# Patient Record
Sex: MALE | Race: ASIAN | NOT HISPANIC OR LATINO | URBAN - METROPOLITAN AREA
[De-identification: names, ages, dates, MRNs, and addresses within clinical notes are randomized per-mention and may not be internally consistent; named-entity substitution may affect disease eponyms.]

---

## 2020-07-30 ENCOUNTER — OUTPATIENT (OUTPATIENT)
Dept: OUTPATIENT SERVICES | Facility: HOSPITAL | Age: 50
LOS: 1 days | End: 2020-07-30
Payer: COMMERCIAL

## 2020-07-30 ENCOUNTER — APPOINTMENT (OUTPATIENT)
Dept: UROLOGY | Facility: CLINIC | Age: 50
End: 2020-07-30
Payer: COMMERCIAL

## 2020-07-30 VITALS
WEIGHT: 182 LBS | HEIGHT: 71 IN | BODY MASS INDEX: 25.48 KG/M2 | HEART RATE: 83 BPM | SYSTOLIC BLOOD PRESSURE: 136 MMHG | DIASTOLIC BLOOD PRESSURE: 80 MMHG

## 2020-07-30 VITALS — TEMPERATURE: 98.4 F

## 2020-07-30 DIAGNOSIS — Z78.9 OTHER SPECIFIED HEALTH STATUS: ICD-10-CM

## 2020-07-30 PROCEDURE — 52000 CYSTOURETHROSCOPY: CPT

## 2020-07-30 PROCEDURE — 76775 US EXAM ABDO BACK WALL LIM: CPT | Mod: 26

## 2020-07-30 PROCEDURE — 76775 US EXAM ABDO BACK WALL LIM: CPT

## 2020-07-30 PROCEDURE — 99204 OFFICE O/P NEW MOD 45 MIN: CPT | Mod: 25

## 2020-07-30 RX ORDER — ENALAPRIL MALEATE AND HYDROCHLOROTHIAZIDE 10; 25 MG/1; MG/1
10-25 TABLET ORAL
Refills: 0 | Status: ACTIVE | COMMUNITY

## 2020-07-30 RX ORDER — BISOPROLOL FUMARATE 5 MG/1
5 TABLET, FILM COATED ORAL
Refills: 0 | Status: ACTIVE | COMMUNITY

## 2020-07-30 RX ORDER — AMLODIPINE BESYLATE 5 MG/1
TABLET ORAL
Refills: 0 | Status: ACTIVE | COMMUNITY

## 2020-07-31 LAB
PSA FREE FLD-MCNC: 21 %
PSA FREE SERPL-MCNC: 0.41 NG/ML
PSA SERPL-MCNC: 1.93 NG/ML

## 2020-08-01 ENCOUNTER — OUTPATIENT (OUTPATIENT)
Dept: OUTPATIENT SERVICES | Facility: HOSPITAL | Age: 50
LOS: 1 days | End: 2020-08-01
Payer: COMMERCIAL

## 2020-08-01 ENCOUNTER — APPOINTMENT (OUTPATIENT)
Dept: CT IMAGING | Facility: IMAGING CENTER | Age: 50
End: 2020-08-01

## 2020-08-01 ENCOUNTER — RESULT REVIEW (OUTPATIENT)
Age: 50
End: 2020-08-01

## 2020-08-01 DIAGNOSIS — R31.0 GROSS HEMATURIA: ICD-10-CM

## 2020-08-01 PROCEDURE — 74178 CT ABD&PLV WO CNTR FLWD CNTR: CPT

## 2020-08-01 PROCEDURE — 82565 ASSAY OF CREATININE: CPT

## 2020-08-01 PROCEDURE — 74178 CT ABD&PLV WO CNTR FLWD CNTR: CPT | Mod: 26

## 2020-08-02 NOTE — LETTER BODY
[FreeTextEntry1] : Minal Arias MD\par [address]\[place] [address]\par [phone]\par \par Dear Dr. Arias,\par \par Reason for visit: Gross hematuria. Right flank pain.\par \par This is a 50 year-old man with gross hematuria and right flank pain. He denies any dysuria or urinary incontinence. The patient denies any aggravating or relieving factors. The patient denies any interference of function. The patient is entirely asymptomatic. All other review of systems are negative. He has no cancer in his family medical history. He has no previous surgical history. Past medical history, family history and social history were inquired and were noncontributory to current condition. The patient does not use tobacco or drink alcohol. Medications and allergies were reviewed. He has no known allergies to medication. \par \par On examination, the patient is a healthy-appearing gentleman in no acute distress. He is alert and oriented follows commands. He has normal mood and affect. He is normocephalic. Neck is supple. Respirations are unlabored. His abdomen is soft and nontender. Bladder is nonpalpable. No CVA tenderness. Neurologically he is grossly intact. No peripheral edema. Skin without gross abnormality. He has normal male external genitalia. Normal meatus. Bilateral testes are descended intrascrotally and normal to palpation. On rectal examination, there is normal sphincter tone. The prostate is clinically benign without focal induration or nodularity.\par \par Assessment: Gross hematuria.\par \par I counseled the patient on the various etiologies of the gross hematuria. I discussed the risk of occult malignancy. I counseled the patient about gross hematuria. I recommended patient obtain urinalysis, urine cytology, cystoscopy, and renal ultrasound to further evaluate. I counseled the patient about the procedures. I answered the patient's questions. The risks and expected outcomes were discussed. The patient will follow up as directed and contact me with any questions or concerns. Thank you for the opportunity to participate in the care of Mr. REID. I will keep you updated on his progress.\par \par Plan: Urinalysis. Cytology. Cystoscopy. Renal ultrasound. \par \par I personally reviewed ultrasound images with the patient today. Images demonstrated that both kidneys are normal in size and echogenicity without stones, hydronephrosis or solid masses visualized. \par \par His cystoscopy today demonstrated a buldge on the right ureteral orifice. I recommend he obtain a CT urogram for further evaluation.  I counseled the patient regarding the procedure. The risks and benefits were discussed. Alternatives were given. I answered the patient questions. The patient will take the necessary preparations for the procedure. \par \par Plan: CT urogram. Follow up as directed.

## 2020-08-02 NOTE — ADDENDUM
[FreeTextEntry1] : Entered by Danny Rutledge, acting as scribe for Dr. Silas London.\par \par The documentation recorded by the scribe accurately reflects the service I personally performed and the decisions made by me.

## 2020-08-03 LAB — URINE CYTOLOGY: NORMAL

## 2020-08-03 RX ORDER — TAMSULOSIN HYDROCHLORIDE 0.4 MG/1
0.4 CAPSULE ORAL
Qty: 30 | Refills: 3 | Status: ACTIVE | COMMUNITY
Start: 2020-08-03 | End: 1900-01-01

## 2020-08-04 DIAGNOSIS — R31.0 GROSS HEMATURIA: ICD-10-CM

## 2020-08-04 DIAGNOSIS — Z78.9 OTHER SPECIFIED HEALTH STATUS: ICD-10-CM

## 2020-08-04 DIAGNOSIS — R10.9 UNSPECIFIED ABDOMINAL PAIN: ICD-10-CM

## 2020-08-20 ENCOUNTER — OUTPATIENT (OUTPATIENT)
Dept: OUTPATIENT SERVICES | Facility: HOSPITAL | Age: 50
LOS: 1 days | End: 2020-08-20
Payer: COMMERCIAL

## 2020-08-20 ENCOUNTER — APPOINTMENT (OUTPATIENT)
Dept: UROLOGY | Facility: CLINIC | Age: 50
End: 2020-08-20
Payer: COMMERCIAL

## 2020-08-20 VITALS — TEMPERATURE: 98.1 F

## 2020-08-20 DIAGNOSIS — R35.0 FREQUENCY OF MICTURITION: ICD-10-CM

## 2020-08-20 PROCEDURE — 99213 OFFICE O/P EST LOW 20 MIN: CPT | Mod: 25

## 2020-08-20 PROCEDURE — 76775 US EXAM ABDO BACK WALL LIM: CPT | Mod: 26

## 2020-08-20 PROCEDURE — 76775 US EXAM ABDO BACK WALL LIM: CPT

## 2020-08-20 NOTE — LETTER BODY
[FreeTextEntry1] : Minal Arias MD\par [address]\[place] [address]\par [phone]\par \par Dear Dr. Arias,\par \par Reason for visit: Gross hematuria. Right flank pain.\par \par This is a 50 year-old man with previous gross hematuria and right flank pain. Patient underwent cystoscopy last visit demonstrating a buldge in the right ureteral orifice. He obtained a CT urogram two weeks ago which demonstrated right hydronephrosis secondary to a 4 mm distal ureteral stone. He requested medical expulsion therapy with Flomax and returns today for renal ultrasound. Since he was last seen, patient continues to take Flomax but denies passing any stones. He denies any interval changes in health.  All other review of systems are negative. Past medical history, family history and social history were unchanged. Medications and allergies were reviewed. He has no known allergies to medication. \par \par On examination, the patient is a healthy-appearing gentleman in no acute distress. He is alert and oriented follows commands. He has normal mood and affect. He is normocephalic. Neck is supple. Respirations are unlabored. His abdomen is soft and nontender. Bladder is nonpalpable. No CVA tenderness. Neurologically he is grossly intact. No peripheral edema. Skin without gross abnormality. He has normal male external genitalia. Normal meatus. Bilateral testes are descended intrascrotally and normal to palpation. On rectal examination, there is normal sphincter tone. The prostate is clinically benign without focal induration or nodularity.\par \par His PSA is 1.93, within normal limits. His urine cytology was negative.\par \par I personally reviewed ultrasound images with the patient today. Images demonstrated multiple stones in the urinary bladder, the largest measures 3.1 mm. Both kidneys are normal in size and echogenicity without stones, hydronephrosis or solid masses visualized. \par \par \par Assessment: Right ureteral stone.\par \par I counseled the patient. Ultrasound today demonstrated persistent right ureteral stone in the same location as prior CT scan. I recommend he continue taking Flomax for medical expulsion therapy. I also encouraged frequent hydration to facilitate stone passage. I answered the patient's questions. The risks and expected outcomes were discussed. The patient will follow up as directed and contact me with any questions or concerns. Thank you for the opportunity to participate in the care of Mr. REID. I will keep you updated on his progress.\par \par Plan: Continue Flomax. Follow up as directed.

## 2020-08-26 DIAGNOSIS — N20.1 CALCULUS OF URETER: ICD-10-CM

## 2020-12-14 ENCOUNTER — APPOINTMENT (OUTPATIENT)
Dept: UROLOGY | Facility: CLINIC | Age: 50
End: 2020-12-14
Payer: COMMERCIAL

## 2020-12-14 ENCOUNTER — OUTPATIENT (OUTPATIENT)
Dept: OUTPATIENT SERVICES | Facility: HOSPITAL | Age: 50
LOS: 1 days | End: 2020-12-14
Payer: COMMERCIAL

## 2020-12-14 DIAGNOSIS — R35.0 FREQUENCY OF MICTURITION: ICD-10-CM

## 2020-12-14 PROCEDURE — 99072 ADDL SUPL MATRL&STAF TM PHE: CPT

## 2020-12-14 PROCEDURE — 99214 OFFICE O/P EST MOD 30 MIN: CPT | Mod: 25

## 2020-12-14 PROCEDURE — 76775 US EXAM ABDO BACK WALL LIM: CPT

## 2020-12-14 PROCEDURE — 76775 US EXAM ABDO BACK WALL LIM: CPT | Mod: 26

## 2020-12-14 NOTE — ADDENDUM
[FreeTextEntry1] : Entered by Amandeep Kate, acting as scribe for Dr. Silas London.\par \par The documentation recorded by the scribe accurately reflects the service I personally performed and the decisions made by me.\par

## 2020-12-14 NOTE — LETTER BODY
[FreeTextEntry1] : Minal Arias MD\par [address]\[place] [address]\par [phone]\par \par Dear Dr. Arias,\par \par Reason for visit: Previous gross hematuria. Intermittent RLQ pain.\par \par This is a 50 year-old man with previous gross hematuria and intermittent RLQ pain. Patient underwent cystoscopy in July demonstrating a bulge in the right ureteral orifice. He obtained a CT urogram in August which demonstrated right hydronephrosis secondary to a 4 mm distal ureteral stone. HIs renal ultrasound in Aug demonstrated multiple stones in the urinary bladder, the largest measuring 3.1 mm. He returns today for follow-up renal ultrasound. Since he was last seen, the patient reports passing a stone fragment, which he saved and brought with him today. The patient also complains of intermittent RLQ pain. He denies any changes in health. All other review of systems are negative. Past medical history, family history and social history were unchanged. Medications and allergies were reviewed. He has no known allergies to medication. \par \par On examination, the patient is a healthy-appearing gentleman in no acute distress. He is alert and oriented follows commands. He has normal mood and affect. He is normocephalic. Neck is supple. Respirations are unlabored. His abdomen is soft and nontender. Bladder is nonpalpable. No CVA tenderness. Neurologically he is grossly intact. No peripheral edema. Skin without gross abnormality. He has normal male external genitalia. Normal meatus. Bilateral testes are descended intrascrotally and normal to palpation. On rectal examination, there is normal sphincter tone. The prostate is clinically benign without focal induration or nodularity.\par \par I personally reviewed ultrasound images with the patient today and images demonstrated unremarkable exam. No renal or bladder stones bilaterally. Both kidneys are normal in size and echogenicity without stones, hydronephrosis or solid masses visualized\par \par Assessment: Right ureteral stone. Intermittent RLQ pain.\par \par I counseled the patient. His renal ultrasound today was unremarkable. I recommended he obtain a noncontrast CT abdomen and pelvis for further evaluation. Given that he passed and brought a stone fragment with him today, I recommended he obtain calculus analysis today to evaluate the composition of his stone. The patient will follow-up in 1 year to ensure stability. Risks and alternatives were discussed. I answered the patient questions. The patient will follow-up as directed and will contact me with any questions or concerns. Thank you for the opportunity to participate in the care of Mr. REID. I will keep you updated on his progress.\par \par Plan: Calculus analysis. CT abdomen and pelvis. Follow-up in 1 year.

## 2020-12-18 DIAGNOSIS — N20.1 CALCULUS OF URETER: ICD-10-CM

## 2020-12-18 DIAGNOSIS — R31.0 GROSS HEMATURIA: ICD-10-CM

## 2020-12-18 DIAGNOSIS — R10.9 UNSPECIFIED ABDOMINAL PAIN: ICD-10-CM

## 2020-12-18 LAB — NIDUS STONE QN: NORMAL

## 2020-12-28 ENCOUNTER — APPOINTMENT (OUTPATIENT)
Dept: CT IMAGING | Facility: IMAGING CENTER | Age: 50
End: 2020-12-28
Payer: COMMERCIAL

## 2020-12-28 ENCOUNTER — OUTPATIENT (OUTPATIENT)
Dept: OUTPATIENT SERVICES | Facility: HOSPITAL | Age: 50
LOS: 1 days | End: 2020-12-28
Payer: COMMERCIAL

## 2020-12-28 DIAGNOSIS — Z00.8 ENCOUNTER FOR OTHER GENERAL EXAMINATION: ICD-10-CM

## 2020-12-28 DIAGNOSIS — R31.0 GROSS HEMATURIA: ICD-10-CM

## 2020-12-28 DIAGNOSIS — Z00.00 ENCOUNTER FOR GENERAL ADULT MEDICAL EXAMINATION WITHOUT ABNORMAL FINDINGS: ICD-10-CM

## 2020-12-28 DIAGNOSIS — R10.9 UNSPECIFIED ABDOMINAL PAIN: ICD-10-CM

## 2020-12-28 DIAGNOSIS — N20.1 CALCULUS OF URETER: ICD-10-CM

## 2020-12-28 PROCEDURE — 74176 CT ABD & PELVIS W/O CONTRAST: CPT

## 2020-12-28 PROCEDURE — 74176 CT ABD & PELVIS W/O CONTRAST: CPT | Mod: 26

## 2021-01-04 ENCOUNTER — NON-APPOINTMENT (OUTPATIENT)
Age: 51
End: 2021-01-04

## 2021-01-05 ENCOUNTER — APPOINTMENT (OUTPATIENT)
Dept: UROLOGY | Facility: CLINIC | Age: 51
End: 2021-01-05

## 2021-07-06 ENCOUNTER — APPOINTMENT (OUTPATIENT)
Dept: ULTRASOUND IMAGING | Facility: IMAGING CENTER | Age: 51
End: 2021-07-06

## 2021-07-12 ENCOUNTER — APPOINTMENT (OUTPATIENT)
Dept: UROLOGY | Facility: CLINIC | Age: 51
End: 2021-07-12

## 2021-07-26 ENCOUNTER — OUTPATIENT (OUTPATIENT)
Dept: OUTPATIENT SERVICES | Facility: HOSPITAL | Age: 51
LOS: 1 days | End: 2021-07-26
Payer: COMMERCIAL

## 2021-07-26 ENCOUNTER — APPOINTMENT (OUTPATIENT)
Dept: UROLOGY | Facility: CLINIC | Age: 51
End: 2021-07-26
Payer: COMMERCIAL

## 2021-07-26 DIAGNOSIS — R35.0 FREQUENCY OF MICTURITION: ICD-10-CM

## 2021-07-26 DIAGNOSIS — N20.1 CALCULUS OF URETER: ICD-10-CM

## 2021-07-26 PROCEDURE — 76775 US EXAM ABDO BACK WALL LIM: CPT | Mod: 26

## 2021-07-26 PROCEDURE — 76775 US EXAM ABDO BACK WALL LIM: CPT

## 2021-07-26 PROCEDURE — 99213 OFFICE O/P EST LOW 20 MIN: CPT

## 2021-07-26 NOTE — LETTER BODY
[FreeTextEntry1] : Minal Arias MD\par [address]\[place] [address]\par [phone]\par \par Dear Dr. Arias,\par \par Reason for visit: Previous gross hematuria. Intermittent RLQ pain.\par \par This is a 51 year-old man with previous gross hematuria and intermittent RLQ pain. Patient underwent cystoscopy in July 2020 demonstrating a bulge in the right ureteral orifice. He obtained a CT urogram in August 2020 which demonstrated right hydronephrosis secondary to a 4 mm distal ureteral stone. HIs renal ultrasound in Aug demonstrated multiple stones in the urinary bladder, the largest measuring 3.1 mm. The patient previously passed a stone fragment. Calculus analysis demonstrated a calcium oxalate stone. He returns today for follow-up renal ultrasound. Since he was last seen, he denies any changes in health. All other review of systems are negative. Past medical history, family history and social history were unchanged. Medications and allergies were reviewed. He has no known allergies to medication. \par \par On examination, the patient is a healthy-appearing gentleman in no acute distress. He is alert and oriented follows commands. He has normal mood and affect. He is normocephalic. Neck is supple. Respirations are unlabored. His abdomen is soft and nontender. Bladder is nonpalpable. No CVA tenderness. Neurologically he is grossly intact. No peripheral edema. Skin without gross abnormality. He has normal male external genitalia. Normal meatus. Bilateral testes are descended intrascrotally and normal to palpation. On rectal examination, there is normal sphincter tone. The prostate is clinically benign without focal induration or nodularity.\par \par I personally reviewed ultrasound images with the patient today and images demonstrated unremarkable exam. Both kidneys are normal in size and echogenicity without hydronephrosis, stones or solid masses visualized. \par \par Assessment: Right ureteral stone. Intermittent RLQ pain.\par \par I counseled the patient. His renal ultrasound today was unremarkable. I encouraged patient to maintain a low-protein low-sodium diet. I also encouraged hydration to prevent stone formation. The patient will follow-up in 1 year to ensure stability. Risks and alternatives were discussed. I answered the patient questions. Patient understands that if he develops gross hematuria or any urinary discomfort, he will contact me for further evaluation. The patient will follow-up as directed and will contact me with any questions or concerns. Thank you for the opportunity to participate in the care of Mr. REID. I will keep you updated on his progress.\par \par Plan:  Follow-up in 1 year for repeat renal ultrasound.

## 2021-07-26 NOTE — HISTORY OF PRESENT ILLNESS
[FreeTextEntry1] : Please refer to URO Consult note \par \par FUA KIDNEY STONE\par STONE ANALYSIS Stone analysis demonstrated calcium oxalate stone. \par \par RENAL US UNREMARKABLE\par \par Followup in 1 year RENAL US

## 2022-03-12 ENCOUNTER — TRANSCRIPTION ENCOUNTER (OUTPATIENT)
Age: 52
End: 2022-03-12

## 2022-07-25 ENCOUNTER — APPOINTMENT (OUTPATIENT)
Dept: UROLOGY | Facility: CLINIC | Age: 52
End: 2022-07-25

## 2022-07-25 ENCOUNTER — OUTPATIENT (OUTPATIENT)
Dept: OUTPATIENT SERVICES | Facility: HOSPITAL | Age: 52
LOS: 1 days | End: 2022-07-25
Payer: COMMERCIAL

## 2022-07-25 DIAGNOSIS — R35.0 FREQUENCY OF MICTURITION: ICD-10-CM

## 2022-07-25 PROCEDURE — 76775 US EXAM ABDO BACK WALL LIM: CPT | Mod: 26

## 2022-07-25 PROCEDURE — 99213 OFFICE O/P EST LOW 20 MIN: CPT | Mod: 25

## 2022-07-25 PROCEDURE — 76775 US EXAM ABDO BACK WALL LIM: CPT

## 2022-07-27 LAB
ANION GAP SERPL CALC-SCNC: 14 MMOL/L
APPEARANCE: CLEAR
BACTERIA: NEGATIVE
BILIRUBIN URINE: NEGATIVE
BLOOD URINE: NEGATIVE
BUN SERPL-MCNC: 14 MG/DL
CALCIUM SERPL-MCNC: 10 MG/DL
CHLORIDE SERPL-SCNC: 102 MMOL/L
CO2 SERPL-SCNC: 27 MMOL/L
COLOR: COLORLESS
CREAT SERPL-MCNC: 1.01 MG/DL
EGFR: 91 ML/MIN/1.73M2
GLUCOSE QUALITATIVE U: NEGATIVE
GLUCOSE SERPL-MCNC: 90 MG/DL
HYALINE CASTS: 0 /LPF
KETONES URINE: NEGATIVE
LEUKOCYTE ESTERASE URINE: NEGATIVE
MICROSCOPIC-UA: NORMAL
NITRITE URINE: NEGATIVE
PH URINE: 6
POTASSIUM SERPL-SCNC: 4.6 MMOL/L
PROTEIN URINE: NEGATIVE
PSA FREE FLD-MCNC: 14 %
PSA FREE SERPL-MCNC: 0.61 NG/ML
PSA SERPL-MCNC: 4.38 NG/ML
RED BLOOD CELLS URINE: 0 /HPF
SODIUM SERPL-SCNC: 143 MMOL/L
SPECIFIC GRAVITY URINE: 1.01
SQUAMOUS EPITHELIAL CELLS: 0 /HPF
UROBILINOGEN URINE: NORMAL
WHITE BLOOD CELLS URINE: 0 /HPF

## 2022-07-28 LAB — URINE CYTOLOGY: NORMAL

## 2022-08-02 NOTE — LETTER BODY
[FreeTextEntry1] : Minal Arias MD\par [address]\[place] [address]\par [phone]\par \par Dear Dr. Arias,\par \par Reason for visit: Previous gross hematuria. Intermittent RLQ pain. Right calcium oxalate stone.\par \par This is a 52 year-old man with previous gross hematuria and intermittent RLQ pain. Patient underwent cystoscopy in July 2020 demonstrating a bulge in the right ureteral orifice. He obtained a CT urogram in August 2020 which demonstrated right hydronephrosis secondary to a 4 mm distal ureteral stone. HIs renal ultrasound in Aug demonstrated multiple stones in the urinary bladder, the largest measuring 3.1 mm. The patient previously passed a stone fragment. Calculus analysis demonstrated a calcium oxalate stone. He returns today for follow-up renal ultrasound. Since he was last seen, he denies any changes in health. All other review of systems are negative. Past medical history, family history and social history were unchanged. Medications and allergies were reviewed. He has no known allergies to medication. \par \par On examination, the patient is a healthy-appearing gentleman in no acute distress. He is alert and oriented follows commands. He has normal mood and affect. He is normocephalic. Neck is supple. Respirations are unlabored. His abdomen is soft and nontender. Bladder is nonpalpable. No CVA tenderness. Neurologically he is grossly intact. No peripheral edema. Skin without gross abnormality. He has normal male external genitalia. Normal meatus. Bilateral testes are descended intrascrotally and normal to palpation. On rectal examination, there is normal sphincter tone. The prostate is clinically benign without focal induration or nodularity.\par \par I personally reviewed ultrasound images with the patient today and images demonstrated unremarkable exam. Both kidneys are normal in size and echogenicity without hydronephrosis, stones or solid masses visualized. \par \par Assessment: Right ureteral stone. Intermittent RLQ pain.\par \par I counseled the patient. His renal ultrasound today was unremarkable. I encouraged patient to maintain a low-protein low-sodium diet. I also encouraged hydration to prevent stone formation. I recommended the patient obtain PSA, BMP, urinalysis, and urine cytology to monitor his urinary function. Risks and alternatives were discussed. I answered the patient questions. Patient understands that if he develops gross hematuria or any urinary discomfort, he will contact me for further evaluation. The patient will follow-up as directed and will contact me with any questions or concerns. Thank you for the opportunity to participate in the care of Mr. REID. I will keep you updated on his progress.\par \par Plan: Maintain low protein low sodium diet. Encourage hydration. PSA. BMP. Urinalysis. Urine culture. Follow-up as needed\par

## 2022-08-02 NOTE — ADDENDUM
[FreeTextEntry1] : Entered by ARA BRICE , acting as scribe for Dr. Silas London.\par The documentation recorded by the scribe accurately reflects the service I personally performed and the decisions made by me.

## 2022-08-02 NOTE — HISTORY OF PRESENT ILLNESS
[FreeTextEntry1] : Follow-up kidney stone.  Patient has calcium oxalate stone.  Patient denies any interval complaints.\par \par Ultrasound today demonstrated no hydronephrosis.\par \par I encouraged patient to maintain a low-protein low-sodium diet. I also encouraged hydration to prevent stone formation. \par \par PSA.  Follow-up as needed.\par \par Please refer to URO Consult note\par

## 2022-08-06 DIAGNOSIS — R31.0 GROSS HEMATURIA: ICD-10-CM

## 2022-08-06 DIAGNOSIS — Z00.00 ENCOUNTER FOR GENERAL ADULT MEDICAL EXAMINATION WITHOUT ABNORMAL FINDINGS: ICD-10-CM

## 2022-08-06 DIAGNOSIS — N20.1 CALCULUS OF URETER: ICD-10-CM

## 2022-08-06 DIAGNOSIS — R10.9 UNSPECIFIED ABDOMINAL PAIN: ICD-10-CM

## 2022-08-28 LAB
PSA FREE FLD-MCNC: 16 %
PSA FREE SERPL-MCNC: 0.58 NG/ML
PSA SERPL-MCNC: 3.69 NG/ML

## 2022-08-29 ENCOUNTER — NON-APPOINTMENT (OUTPATIENT)
Age: 52
End: 2022-08-29

## 2023-01-30 ENCOUNTER — APPOINTMENT (OUTPATIENT)
Dept: UROLOGY | Facility: CLINIC | Age: 53
End: 2023-01-30
Payer: COMMERCIAL

## 2023-01-30 ENCOUNTER — APPOINTMENT (OUTPATIENT)
Dept: ULTRASOUND IMAGING | Facility: IMAGING CENTER | Age: 53
End: 2023-01-30

## 2023-01-30 ENCOUNTER — OUTPATIENT (OUTPATIENT)
Dept: OUTPATIENT SERVICES | Facility: HOSPITAL | Age: 53
LOS: 1 days | End: 2023-01-30
Payer: COMMERCIAL

## 2023-01-30 DIAGNOSIS — R31.0 GROSS HEMATURIA: ICD-10-CM

## 2023-01-30 DIAGNOSIS — R10.9 UNSPECIFIED ABDOMINAL PAIN: ICD-10-CM

## 2023-01-30 DIAGNOSIS — N20.1 CALCULUS OF URETER: ICD-10-CM

## 2023-01-30 DIAGNOSIS — Z00.00 ENCOUNTER FOR GENERAL ADULT MEDICAL EXAMINATION W/OUT ABNORMAL FINDINGS: ICD-10-CM

## 2023-01-30 PROCEDURE — 76775 US EXAM ABDO BACK WALL LIM: CPT | Mod: 26

## 2023-01-30 PROCEDURE — 99213 OFFICE O/P EST LOW 20 MIN: CPT

## 2023-01-30 PROCEDURE — 76775 US EXAM ABDO BACK WALL LIM: CPT

## 2023-01-30 PROCEDURE — 88112 CYTOPATH CELL ENHANCE TECH: CPT | Mod: 26

## 2023-01-30 NOTE — LETTER BODY
[FreeTextEntry1] : Minal Arias MD\par [address]\[place] [address]\par [phone]\par \par Dear Dr. Arias,\par \par Reason for visit: Previous gross hematuria. Intermittent RLQ pain. Right calcium oxalate stone. Elevated PSA. \par \par This is a 52 year-old man with previous gross hematuria and intermittent RLQ pain. Patient underwent cystoscopy in July 2020 demonstrating a bulge in the right ureteral orifice. He obtained a CT urogram in August 2020 which demonstrated right hydronephrosis secondary to a 4 mm distal ureteral stone. His renal ultrasound in Aug demonstrated multiple stones in the urinary bladder, the largest measuring 3.1 mm. The patient previously passed a stone fragment. Calculus analysis demonstrated a calcium oxalate stone. His last ultrasound was unremarkable. He returns today for follow-up. Since he was last seen, he denies any changes in health. All other review of systems are negative. Past medical history, family history and social history were unchanged. Medications and allergies were reviewed. He has no known allergies to medication. \par \par On examination, the patient is a healthy-appearing gentleman in no acute distress. He is alert and oriented follows commands. He has normal mood and affect. He is normocephalic. Neck is supple. Respirations are unlabored. His abdomen is soft and nontender. Bladder is nonpalpable. No CVA tenderness. Neurologically he is grossly intact. No peripheral edema. Skin without gross abnormality. He has normal male external genitalia. Normal meatus. Bilateral testes are descended intrascrotally and normal to palpation. On rectal examination, there is normal sphincter tone. The prostate is clinically benign without focal induration or nodularity.\par \par Assessment: Right ureteral stone. Intermittent RLQ pain. Elevated PSA. Hematuria. \par \par I counseled the patient. In terms of his previous kidney stones, I recommended that he repeat ultrasound. In terms of his history of elevated PSA, I recommended that he repeat PSA today. In terms of his hematuria, I recommended that he repeat urinalysis today to evaluate for infection. I also encouraged the patient to obtain BMP and urine cytology today. Risks and alternatives were discussed. I answered the patient questions. Patient understands that if he develops gross hematuria or any urinary discomfort, he will contact me for further evaluation. The patient will follow-up in 1 year and will contact me with any questions or concerns. Thank you for the opportunity to participate in the care of Mr. REID. I will keep you updated on his progress.\par \par Plan: Ultrasound. PSA. BMP. Urinalysis. Urine cytology. Follow-up in 1 year.

## 2023-01-30 NOTE — ADDENDUM
[FreeTextEntry1] : Entered by Michela Roberts, acting as scribe for Dr. Silas London.\par The documentation recorded by the scribe accurately reflects the service I personally performed and the decisions made by me.

## 2023-02-02 LAB
ANION GAP SERPL CALC-SCNC: 14 MMOL/L
APPEARANCE: CLEAR
BACTERIA: NEGATIVE
BILIRUBIN URINE: NEGATIVE
BLOOD URINE: NEGATIVE
BUN SERPL-MCNC: 14 MG/DL
CALCIUM SERPL-MCNC: 9.9 MG/DL
CHLORIDE SERPL-SCNC: 106 MMOL/L
CO2 SERPL-SCNC: 26 MMOL/L
COLOR: YELLOW
CREAT SERPL-MCNC: 1.1 MG/DL
EGFR: 82 ML/MIN/1.73M2
GLUCOSE QUALITATIVE U: NEGATIVE
GLUCOSE SERPL-MCNC: 91 MG/DL
HYALINE CASTS: 0 /LPF
KETONES URINE: NEGATIVE
LEUKOCYTE ESTERASE URINE: NEGATIVE
MICROSCOPIC-UA: NORMAL
NITRITE URINE: NEGATIVE
PH URINE: 6
POTASSIUM SERPL-SCNC: 4.7 MMOL/L
PROTEIN URINE: NORMAL
PSA FREE FLD-MCNC: 16 %
PSA FREE SERPL-MCNC: 0.57 NG/ML
PSA SERPL-MCNC: 3.5 NG/ML
RED BLOOD CELLS URINE: 1 /HPF
SODIUM SERPL-SCNC: 146 MMOL/L
SPECIFIC GRAVITY URINE: 1.03
SQUAMOUS EPITHELIAL CELLS: 0 /HPF
URINE CYTOLOGY: NORMAL
UROBILINOGEN URINE: NORMAL
WHITE BLOOD CELLS URINE: 1 /HPF

## 2024-02-12 ENCOUNTER — APPOINTMENT (OUTPATIENT)
Dept: UROLOGY | Facility: CLINIC | Age: 54
End: 2024-02-12
Payer: COMMERCIAL

## 2024-02-12 PROCEDURE — 99214 OFFICE O/P EST MOD 30 MIN: CPT

## 2024-02-12 NOTE — HISTORY OF PRESENT ILLNESS
[FreeTextEntry1] : Follow-up kidney stone.  Patient saw stone last was unremarkable.  He denies any flank pain.  Encourage hydration.  I encouraged patient to maintain a low-protein low-sodium diet. I also encouraged hydration to prevent stone formation.   Follow-up hematuria.  Amrit urinalysis.  Cytology.  PSA.  Follow-up 1 year.  Patient urinary frequency.  He declines medical therapy.  Consider Flomax.  Please refer to URO Consult note

## 2024-02-12 NOTE — LETTER BODY
[FreeTextEntry1] : Minal Arias MD [address] [id number] [phone]  Dear Dr. Arias,  Reason for visit: Previous gross hematuria. Intermittent RLQ pain. Right calcium oxalate stone. Elevated PSA. Urinary frequency.    This is a 53 year-old man with previous gross hematuria and intermittent RLQ pain. Patient underwent cystoscopy in July 2020 demonstrating a bulge in the right ureteral orifice. He obtained a CT urogram in August 2020 which demonstrated right hydronephrosis secondary to a 4 mm distal ureteral stone. His renal ultrasound in Aug demonstrated multiple stones in the urinary bladder, the largest measuring 3.1 mm. The patient previously passed a stone fragment. Calculus analysis demonstrated a calcium oxalate stone. His last ultrasound was unremarkable. He returns today for follow-up. Since he was last seen, he denies any flank pain. Patient reports of urinary frequency. All other review of systems are negative. Past medical history, family history and social history were unchanged. Medications and allergies were reviewed. He has no known allergies to medication.    On examination, the patient is a healthy-appearing gentleman in no acute distress. He is alert and oriented follows commands. He has normal mood and affect. He is normocephalic. Neck is supple. Respirations are unlabored. His abdomen is soft and nontender. Bladder is nonpalpable. No CVA tenderness. Neurologically he is grossly intact. No peripheral edema. Skin without gross abnormality.     Assessment: Right ureteral stone. Intermittent RLQ pain. Elevated PSA. Hematuria.    I counseled the patient. In terms of his previous kidney stones, his last ultrasound was unremarkable. I encouraged patient to maintain a low-protein low-sodium diet. I also encouraged hydration to prevent stone formation. In terms of his hematuria, he will repeat urinalysis and urine cytology to look for high grade urothelial carcinoma. He will repeat PSA and BMP to establish baseline. In terms of his urinary frequency, I recommended patient consider Flomax. However, patient declines medical therapy. Risks and alternatives were discussed. I answered the patient questions. Patient understands that if he develops gross hematuria or any urinary discomfort, he will contact me for further evaluation. The patient will follow-up in 1 year and will contact me with any questions or concerns. Thank you for the opportunity to participate in the care of Mr. TOGOCHOG. I will keep you updated on his progress.  Plan: Consider Flomax. Stone diet. Hydration. PSA. BMP. Urinalysis. Urine cytology. Follow-up in 1 year.   I spent 30-minutes time today on all issues related to this patient on today date of service including non face to face time.

## 2024-02-13 LAB
ANION GAP SERPL CALC-SCNC: 12 MMOL/L
APPEARANCE: CLEAR
BACTERIA UR CULT: NORMAL
BACTERIA: NEGATIVE /HPF
BILIRUBIN URINE: NEGATIVE
BLOOD URINE: NEGATIVE
BUN SERPL-MCNC: 13 MG/DL
CALCIUM OXALATE CRYSTALS: PRESENT
CALCIUM SERPL-MCNC: 10.2 MG/DL
CAST: 0 /LPF
CHLORIDE SERPL-SCNC: 101 MMOL/L
CO2 SERPL-SCNC: 27 MMOL/L
COLOR: YELLOW
CREAT SERPL-MCNC: 1.13 MG/DL
EGFR: 79 ML/MIN/1.73M2
EPITHELIAL CELLS: 0 /HPF
GLUCOSE QUALITATIVE U: NEGATIVE MG/DL
GLUCOSE SERPL-MCNC: 109 MG/DL
KETONES URINE: NEGATIVE MG/DL
LEUKOCYTE ESTERASE URINE: NEGATIVE
Lab: PRESENT
MICROSCOPIC-UA: NORMAL
NITRITE URINE: NEGATIVE
PH URINE: 5.5
POTASSIUM SERPL-SCNC: 4.3 MMOL/L
PROTEIN URINE: NEGATIVE MG/DL
PSA FREE FLD-MCNC: 16 %
PSA FREE SERPL-MCNC: 0.66 NG/ML
PSA SERPL-MCNC: 4.13 NG/ML
RED BLOOD CELLS URINE: 2 /HPF
REVIEW: NORMAL
SODIUM SERPL-SCNC: 140 MMOL/L
SPECIFIC GRAVITY URINE: 1.02
URINE CYTOLOGY: NORMAL
UROBILINOGEN URINE: 0.2 MG/DL
WHITE BLOOD CELLS URINE: 0 /HPF

## 2024-02-13 RX ORDER — SODIUM PHOSPHATE, DIBASIC AND SODIUM PHOSPHATE, MONOBASIC 7; 19 G/230ML; G/230ML
ENEMA RECTAL
Qty: 1 | Refills: 0 | Status: ACTIVE | COMMUNITY
Start: 2024-02-13 | End: 1900-01-01

## 2024-02-17 ENCOUNTER — APPOINTMENT (OUTPATIENT)
Dept: MRI IMAGING | Facility: CLINIC | Age: 54
End: 2024-02-17
Payer: COMMERCIAL

## 2024-02-17 ENCOUNTER — RESULT REVIEW (OUTPATIENT)
Age: 54
End: 2024-02-17

## 2024-02-17 ENCOUNTER — APPOINTMENT (OUTPATIENT)
Dept: ULTRASOUND IMAGING | Facility: CLINIC | Age: 54
End: 2024-02-17
Payer: COMMERCIAL

## 2024-02-17 PROCEDURE — 72197 MRI PELVIS W/O & W/DYE: CPT

## 2024-02-17 PROCEDURE — 76775 US EXAM ABDO BACK WALL LIM: CPT

## 2024-02-17 PROCEDURE — A9585: CPT

## 2024-02-17 PROCEDURE — 76498P: CUSTOM

## 2024-02-26 ENCOUNTER — NON-APPOINTMENT (OUTPATIENT)
Age: 54
End: 2024-02-26

## 2024-02-27 ENCOUNTER — NON-APPOINTMENT (OUTPATIENT)
Age: 54
End: 2024-02-27

## 2024-02-28 ENCOUNTER — RESULT REVIEW (OUTPATIENT)
Age: 54
End: 2024-02-28

## 2024-02-28 ENCOUNTER — NON-APPOINTMENT (OUTPATIENT)
Age: 54
End: 2024-02-28

## 2024-02-28 RX ORDER — SODIUM PHOSPHATE, DIBASIC AND SODIUM PHOSPHATE, MONOBASIC 7; 19 G/230ML; G/230ML
ENEMA RECTAL
Qty: 1 | Refills: 0 | Status: ACTIVE | COMMUNITY
Start: 2024-02-28 | End: 1900-01-01

## 2024-02-29 PROCEDURE — 76377 3D RENDER W/INTRP POSTPROCES: CPT

## 2024-03-11 ENCOUNTER — APPOINTMENT (OUTPATIENT)
Dept: UROLOGY | Facility: CLINIC | Age: 54
End: 2024-03-11

## 2024-03-11 DIAGNOSIS — R10.9 UNSPECIFIED ABDOMINAL PAIN: ICD-10-CM

## 2024-03-11 DIAGNOSIS — R31.0 GROSS HEMATURIA: ICD-10-CM

## 2024-03-28 ENCOUNTER — APPOINTMENT (OUTPATIENT)
Dept: UROLOGY | Facility: CLINIC | Age: 54
End: 2024-03-28
Payer: COMMERCIAL

## 2024-03-28 ENCOUNTER — OUTPATIENT (OUTPATIENT)
Dept: OUTPATIENT SERVICES | Facility: HOSPITAL | Age: 54
LOS: 1 days | End: 2024-03-28
Payer: COMMERCIAL

## 2024-03-28 VITALS
BODY MASS INDEX: 25.76 KG/M2 | WEIGHT: 184 LBS | HEART RATE: 84 BPM | HEIGHT: 71 IN | DIASTOLIC BLOOD PRESSURE: 90 MMHG | SYSTOLIC BLOOD PRESSURE: 152 MMHG

## 2024-03-28 DIAGNOSIS — Z87.898 PERSONAL HISTORY OF OTHER SPECIFIED CONDITIONS: ICD-10-CM

## 2024-03-28 DIAGNOSIS — R97.20 ELEVATED PROSTATE, SPECIFIC ANTIGEN [PSA]: ICD-10-CM

## 2024-03-28 DIAGNOSIS — R35.0 FREQUENCY OF MICTURITION: ICD-10-CM

## 2024-03-28 PROCEDURE — 55700: CPT

## 2024-03-28 PROCEDURE — 76999F: CUSTOM | Mod: 26

## 2024-03-28 PROCEDURE — 76999 ECHO EXAMINATION PROCEDURE: CPT

## 2024-03-29 ENCOUNTER — NON-APPOINTMENT (OUTPATIENT)
Age: 54
End: 2024-03-29

## 2024-03-29 DIAGNOSIS — R97.20 ELEVATED PROSTATE SPECIFIC ANTIGEN [PSA]: ICD-10-CM

## 2024-03-29 DIAGNOSIS — Z87.898 PERSONAL HISTORY OF OTHER SPECIFIED CONDITIONS: ICD-10-CM

## 2024-04-05 LAB — PROSTATE BIOPSY: NORMAL

## 2024-09-28 DIAGNOSIS — R31.0 GROSS HEMATURIA: ICD-10-CM

## 2024-09-28 DIAGNOSIS — R97.20 ELEVATED PROSTATE, SPECIFIC ANTIGEN [PSA]: ICD-10-CM

## 2024-09-29 ENCOUNTER — NON-APPOINTMENT (OUTPATIENT)
Age: 54
End: 2024-09-29

## 2024-09-30 ENCOUNTER — APPOINTMENT (OUTPATIENT)
Dept: UROLOGY | Facility: CLINIC | Age: 54
End: 2024-09-30
Payer: COMMERCIAL

## 2024-09-30 ENCOUNTER — NON-APPOINTMENT (OUTPATIENT)
Age: 54
End: 2024-09-30

## 2024-09-30 LAB
ANION GAP SERPL CALC-SCNC: 17 MMOL/L
APPEARANCE: CLEAR
BACTERIA: NEGATIVE /HPF
BILIRUBIN URINE: NEGATIVE
BLOOD URINE: NEGATIVE
BUN SERPL-MCNC: 15 MG/DL
CALCIUM SERPL-MCNC: 10.1 MG/DL
CAST: 0 /LPF
CHLORIDE SERPL-SCNC: 102 MMOL/L
CO2 SERPL-SCNC: 25 MMOL/L
COLOR: YELLOW
CREAT SERPL-MCNC: 1.08 MG/DL
EGFR: 83 ML/MIN/1.73M2
EPITHELIAL CELLS: 0 /HPF
GLUCOSE QUALITATIVE U: NEGATIVE MG/DL
GLUCOSE SERPL-MCNC: 83 MG/DL
KETONES URINE: NEGATIVE MG/DL
LEUKOCYTE ESTERASE URINE: NEGATIVE
MICROSCOPIC-UA: NORMAL
NITRITE URINE: NEGATIVE
PH URINE: 5.5
POTASSIUM SERPL-SCNC: 4.3 MMOL/L
PROTEIN URINE: NEGATIVE MG/DL
PSA FREE FLD-MCNC: 15 %
PSA FREE SERPL-MCNC: 0.69 NG/ML
PSA SERPL-MCNC: 4.54 NG/ML
RED BLOOD CELLS URINE: 0 /HPF
SODIUM SERPL-SCNC: 143 MMOL/L
SPECIFIC GRAVITY URINE: 1.02
UROBILINOGEN URINE: 0.2 MG/DL
WHITE BLOOD CELLS URINE: 1 /HPF

## 2024-09-30 PROCEDURE — 99214 OFFICE O/P EST MOD 30 MIN: CPT

## 2024-09-30 PROCEDURE — G2211 COMPLEX E/M VISIT ADD ON: CPT | Mod: NC

## 2024-09-30 NOTE — ADDENDUM
[FreeTextEntry1] : Entered by Jairo Rutledge, acting as scribe for Dr. Silas London. The documentation recorded by the scribe accurately reflects the service I personally performed and the decisions made by me.

## 2024-09-30 NOTE — LETTER BODY
[FreeTextEntry1] : Minal Arias MD [address] [id number] [phone]  Dear Dr. Arias,  Reason for visit: Previous gross hematuria. Intermittent RLQ pain. Right calcium oxalate stone. Elevated PSA. Urinary frequency.    This is a 54 year-old man with previous gross hematuria, elevated PSA, urinary frequency, and intermittent RLQ pain. Patient underwent cystoscopy in July 2020 demonstrating a bulge in the right ureteral orifice. He obtained a CT urogram in August 2020 which demonstrated right hydronephrosis secondary to a 4 mm distal ureteral stone. His renal ultrasound in Aug demonstrated multiple stones in the urinary bladder, the largest measuring 3.1 mm. The patient previously passed a stone fragment. Calculus analysis demonstrated a calcium oxalate stone. His last ultrasound was unremarkable. He returns today for follow-up. Since he was last seen, patient obtained a negative prostate biopsy in March. He denied any recent gross hematuria. All other review of systems are negative. Past medical history, family history and social history were unchanged. Medications and allergies were reviewed. He has no known allergies to medication.    On examination, the patient is a healthy-appearing gentleman in no acute distress. He is alert and oriented follows commands. He has normal mood and affect. He is normocephalic. Neck is supple. Respirations are unlabored. His abdomen is soft and nontender. Bladder is nonpalpable. No CVA tenderness. Neurologically he is grossly intact. No peripheral edema. Skin without gross abnormality.    I personally reviewed prostate biopsy images with the patient today and images were negative for malignancy.     Assessment: Elevated PSA. Hematuria.    I counseled the patient. In terms of his elevated PSA, the patient obtained a recent prostate biopsy which was negative for malignancy. I recommended the patient repeat PSA and BMP to ensure stability. In terms of his previous gross hematuria, the patient denied any recent episodes. He will repeat urinalysis and urine cytology to evaluate for high grade urothelial carcinoma. The patient will follow-up in 1 year and will contact me with any questions or concerns. Thank you for the opportunity to participate in the care of Mr. REID. I will keep you updated on his progress.  Plan: PSA. BMP. Urinalysis. Urine cytology. Follow-up in 1 year.  I spent 30-minutes time today on all issues related to this patient on today date of service including non face to face time.

## 2024-09-30 NOTE — HISTORY OF PRESENT ILLNESS
[FreeTextEntry1] : Follow-up elevated PSA.  Negative prostate biopsy in March.  Reviewed PSA.  Follow-up 1 year.  Follow-up hematuria.  Repeat urinalysis cytology.  Please refer to URO Consult Note.

## 2024-10-02 LAB — URINE CYTOLOGY: NORMAL
